# Patient Record
Sex: FEMALE | Race: BLACK OR AFRICAN AMERICAN | NOT HISPANIC OR LATINO | Employment: FULL TIME | URBAN - METROPOLITAN AREA
[De-identification: names, ages, dates, MRNs, and addresses within clinical notes are randomized per-mention and may not be internally consistent; named-entity substitution may affect disease eponyms.]

---

## 2023-05-16 ENCOUNTER — OFFICE VISIT (OUTPATIENT)
Dept: INTERNAL MEDICINE | Facility: CLINIC | Age: 53
End: 2023-05-16
Payer: COMMERCIAL

## 2023-05-16 VITALS
SYSTOLIC BLOOD PRESSURE: 126 MMHG | HEART RATE: 81 BPM | WEIGHT: 118.88 LBS | DIASTOLIC BLOOD PRESSURE: 82 MMHG | BODY MASS INDEX: 21.88 KG/M2 | OXYGEN SATURATION: 99 % | TEMPERATURE: 97 F | HEIGHT: 62 IN

## 2023-05-16 DIAGNOSIS — J04.0 LARYNGITIS: ICD-10-CM

## 2023-05-16 DIAGNOSIS — J06.9 UPPER RESPIRATORY TRACT INFECTION, UNSPECIFIED TYPE: Primary | ICD-10-CM

## 2023-05-16 PROCEDURE — 3074F SYST BP LT 130 MM HG: CPT | Mod: CPTII,S$GLB,, | Performed by: INTERNAL MEDICINE

## 2023-05-16 PROCEDURE — 1160F RVW MEDS BY RX/DR IN RCRD: CPT | Mod: CPTII,S$GLB,, | Performed by: INTERNAL MEDICINE

## 2023-05-16 PROCEDURE — 99999 PR PBB SHADOW E&M-EST. PATIENT-LVL IV: ICD-10-PCS | Mod: PBBFAC,,, | Performed by: INTERNAL MEDICINE

## 2023-05-16 PROCEDURE — 1159F PR MEDICATION LIST DOCUMENTED IN MEDICAL RECORD: ICD-10-PCS | Mod: CPTII,S$GLB,, | Performed by: INTERNAL MEDICINE

## 2023-05-16 PROCEDURE — 99214 OFFICE O/P EST MOD 30 MIN: CPT | Mod: S$GLB,,, | Performed by: INTERNAL MEDICINE

## 2023-05-16 PROCEDURE — 3079F PR MOST RECENT DIASTOLIC BLOOD PRESSURE 80-89 MM HG: ICD-10-PCS | Mod: CPTII,S$GLB,, | Performed by: INTERNAL MEDICINE

## 2023-05-16 PROCEDURE — 99999 PR PBB SHADOW E&M-EST. PATIENT-LVL IV: CPT | Mod: PBBFAC,,, | Performed by: INTERNAL MEDICINE

## 2023-05-16 PROCEDURE — 3008F PR BODY MASS INDEX (BMI) DOCUMENTED: ICD-10-PCS | Mod: CPTII,S$GLB,, | Performed by: INTERNAL MEDICINE

## 2023-05-16 PROCEDURE — 99214 PR OFFICE/OUTPT VISIT, EST, LEVL IV, 30-39 MIN: ICD-10-PCS | Mod: S$GLB,,, | Performed by: INTERNAL MEDICINE

## 2023-05-16 PROCEDURE — 3079F DIAST BP 80-89 MM HG: CPT | Mod: CPTII,S$GLB,, | Performed by: INTERNAL MEDICINE

## 2023-05-16 PROCEDURE — 3074F PR MOST RECENT SYSTOLIC BLOOD PRESSURE < 130 MM HG: ICD-10-PCS | Mod: CPTII,S$GLB,, | Performed by: INTERNAL MEDICINE

## 2023-05-16 PROCEDURE — 3008F BODY MASS INDEX DOCD: CPT | Mod: CPTII,S$GLB,, | Performed by: INTERNAL MEDICINE

## 2023-05-16 PROCEDURE — 1160F PR REVIEW ALL MEDS BY PRESCRIBER/CLIN PHARMACIST DOCUMENTED: ICD-10-PCS | Mod: CPTII,S$GLB,, | Performed by: INTERNAL MEDICINE

## 2023-05-16 PROCEDURE — 1159F MED LIST DOCD IN RCRD: CPT | Mod: CPTII,S$GLB,, | Performed by: INTERNAL MEDICINE

## 2023-05-16 NOTE — PATIENT INSTRUCTIONS
Thank you for coming to visit today.  Your symptoms are most compatible with an upper respiratory infection of viral etiology.  Viruses typically are involved in causing laryngitis.  Most of your symptoms are resolving by will list below my standard over-the-counter recommendations for any future colds and symptoms you may have.  Claritin for nasal congestion usually taken before sleep   Saline nasal spray 4 squirts each nostril every 2-3 hours.  Throat lozenges particularly sour types will increase the amount of saliva which will help make her throat feel better  As she discovered warm tea often helps you feel better and you should continue drinking that.  You should expect to recover in the next several days as you have already described the fact that you are getting better.

## 2023-05-16 NOTE — PROGRESS NOTES
53-year-old woman with no chronic medical problems, on no prescribed medications though she takes over-the-counter vitamins who is here with a chief complaint of a sore throat.    History of present illness and pertinent review of systems:  The patient has approximally a 3 day history of a sore throat and hoarseness.  She would no laryngitis.  She would no fever or chills but she did notice initially a generalized headache which resolved.  There is no earache or rhinorrhea but there was nasal congestion which again has improved.  She did have a cough which is improving without sputum production.  She had no pleurisy or hemoptysis.  She is had no loss of sense of taste or smell.  There has been no diarrhea, rash, myalgias, or malaise.  She notes that her grandchild had a URI and ear infection.  Patient drank tea and used for cola cough drops and felt that helped.  She is concerned a little bit about the failure of her voice to returned to normal.    Problem list, medication list, and allergies were reviewed.    Review of systems is otherwise unremarkable.        Physical Exam  Vitals and nursing note reviewed.   Constitutional:       General: She is not in acute distress.     Appearance: She is normal weight. She is not ill-appearing.   HENT:      Right Ear: Tympanic membrane, ear canal and external ear normal.      Left Ear: Tympanic membrane, ear canal and external ear normal.      Nose: Congestion present. No rhinorrhea.      Mouth/Throat:      Mouth: Mucous membranes are moist.      Pharynx: Oropharynx is clear. Posterior oropharyngeal erythema present. No oropharyngeal exudate.   Eyes:      General: No scleral icterus.     Conjunctiva/sclera: Conjunctivae normal.   Cardiovascular:      Rate and Rhythm: Normal rate and regular rhythm.      Pulses: Normal pulses.      Heart sounds: Normal heart sounds. No murmur heard.    No gallop.   Pulmonary:      Effort: Pulmonary effort is normal. No respiratory distress.       Breath sounds: Normal breath sounds. No wheezing or rales.   Abdominal:      General: Bowel sounds are normal. There is no distension.      Palpations: Abdomen is soft.      Tenderness: There is no abdominal tenderness.      Comments: No hepatosplenomegaly   Musculoskeletal:         General: No swelling or tenderness.      Cervical back: Neck supple. No tenderness.      Right lower leg: No edema.   Lymphadenopathy:      Cervical: No cervical adenopathy.   Skin:     Coloration: Skin is not jaundiced.      Findings: No bruising or rash.   Neurological:      General: No focal deficit present.      Mental Status: She is alert.   Psychiatric:         Mood and Affect: Mood normal.     Assessment/plan:   URI:  Patient has a URI associated with laryngitis which is resolving as are most of her symptoms.  At this time no antibiotics or steroids are necessary and over-the-counter medication should be adequate to address most of her symptoms.    Plan:  Reviewed above with patient   Warm saltwater gargles every 2-3 hours as necessary for patient's discomfort and voice.    Throat lozenges or cough drops particularly sour ones which will create more saliva   Claritin 10 mg if necessary for nasal congestion.  Saline nasal spray 4 squirts each nostril every 2-3 hours as needed      Time with patient record 30 minute

## 2023-10-10 ENCOUNTER — OFFICE VISIT (OUTPATIENT)
Dept: INTERNAL MEDICINE | Facility: CLINIC | Age: 53
End: 2023-10-10
Payer: COMMERCIAL

## 2023-10-10 ENCOUNTER — LAB VISIT (OUTPATIENT)
Dept: LAB | Facility: HOSPITAL | Age: 53
End: 2023-10-10
Payer: COMMERCIAL

## 2023-10-10 VITALS
BODY MASS INDEX: 22.03 KG/M2 | OXYGEN SATURATION: 99 % | SYSTOLIC BLOOD PRESSURE: 126 MMHG | DIASTOLIC BLOOD PRESSURE: 88 MMHG | WEIGHT: 119.69 LBS | HEART RATE: 74 BPM | HEIGHT: 62 IN

## 2023-10-10 DIAGNOSIS — L81.9 SKIN HYPOPIGMENTATION: ICD-10-CM

## 2023-10-10 DIAGNOSIS — L81.9 HYPOPIGMENTATION: Primary | ICD-10-CM

## 2023-10-10 DIAGNOSIS — L81.9 HYPOPIGMENTATION: ICD-10-CM

## 2023-10-10 DIAGNOSIS — Z00.00 HEALTHY ADULT ON ROUTINE PHYSICAL EXAMINATION: ICD-10-CM

## 2023-10-10 DIAGNOSIS — R12 MILD HEARTBURN: ICD-10-CM

## 2023-10-10 PROBLEM — L81.6 SKIN HYPOPIGMENTATION: Status: ACTIVE | Noted: 2023-10-10

## 2023-10-10 LAB
ALBUMIN SERPL BCP-MCNC: 4.2 G/DL (ref 3.5–5.2)
ALP SERPL-CCNC: 87 U/L (ref 55–135)
ALT SERPL W/O P-5'-P-CCNC: 24 U/L (ref 10–44)
ANION GAP SERPL CALC-SCNC: 8 MMOL/L (ref 8–16)
AST SERPL-CCNC: 23 U/L (ref 10–40)
BILIRUB SERPL-MCNC: 0.1 MG/DL (ref 0.1–1)
BUN SERPL-MCNC: 23 MG/DL (ref 6–20)
CALCIUM SERPL-MCNC: 9.7 MG/DL (ref 8.7–10.5)
CHLORIDE SERPL-SCNC: 105 MMOL/L (ref 95–110)
CHOLEST SERPL-MCNC: 202 MG/DL (ref 120–199)
CHOLEST/HDLC SERPL: 3 {RATIO} (ref 2–5)
CO2 SERPL-SCNC: 25 MMOL/L (ref 23–29)
CREAT SERPL-MCNC: 0.8 MG/DL (ref 0.5–1.4)
CRP SERPL-MCNC: 2.2 MG/L (ref 0–8.2)
EST. GFR  (NO RACE VARIABLE): >60 ML/MIN/1.73 M^2
GLUCOSE SERPL-MCNC: 93 MG/DL (ref 70–110)
HDLC SERPL-MCNC: 68 MG/DL (ref 40–75)
HDLC SERPL: 33.7 % (ref 20–50)
LDLC SERPL CALC-MCNC: 114.2 MG/DL (ref 63–159)
NONHDLC SERPL-MCNC: 134 MG/DL
POTASSIUM SERPL-SCNC: 4.1 MMOL/L (ref 3.5–5.1)
PROT SERPL-MCNC: 8 G/DL (ref 6–8.4)
SODIUM SERPL-SCNC: 138 MMOL/L (ref 136–145)
TRIGL SERPL-MCNC: 99 MG/DL (ref 30–150)

## 2023-10-10 PROCEDURE — 84443 ASSAY THYROID STIM HORMONE: CPT

## 2023-10-10 PROCEDURE — 85652 RBC SED RATE AUTOMATED: CPT

## 2023-10-10 PROCEDURE — 99214 PR OFFICE/OUTPT VISIT, EST, LEVL IV, 30-39 MIN: ICD-10-PCS | Mod: S$GLB,,,

## 2023-10-10 PROCEDURE — 3074F PR MOST RECENT SYSTOLIC BLOOD PRESSURE < 130 MM HG: ICD-10-PCS | Mod: CPTII,S$GLB,,

## 2023-10-10 PROCEDURE — 80061 LIPID PANEL: CPT

## 2023-10-10 PROCEDURE — 82652 VIT D 1 25-DIHYDROXY: CPT

## 2023-10-10 PROCEDURE — 86140 C-REACTIVE PROTEIN: CPT

## 2023-10-10 PROCEDURE — 3008F PR BODY MASS INDEX (BMI) DOCUMENTED: ICD-10-PCS | Mod: CPTII,S$GLB,,

## 2023-10-10 PROCEDURE — 3074F SYST BP LT 130 MM HG: CPT | Mod: CPTII,S$GLB,,

## 2023-10-10 PROCEDURE — 80053 COMPREHEN METABOLIC PANEL: CPT

## 2023-10-10 PROCEDURE — 99999 PR PBB SHADOW E&M-EST. PATIENT-LVL III: CPT | Mod: PBBFAC,,,

## 2023-10-10 PROCEDURE — 99999 PR PBB SHADOW E&M-EST. PATIENT-LVL III: ICD-10-PCS | Mod: PBBFAC,,,

## 2023-10-10 PROCEDURE — 86431 RHEUMATOID FACTOR QUANT: CPT

## 2023-10-10 PROCEDURE — 3008F BODY MASS INDEX DOCD: CPT | Mod: CPTII,S$GLB,,

## 2023-10-10 PROCEDURE — 3079F PR MOST RECENT DIASTOLIC BLOOD PRESSURE 80-89 MM HG: ICD-10-PCS | Mod: CPTII,S$GLB,,

## 2023-10-10 PROCEDURE — 82607 VITAMIN B-12: CPT

## 2023-10-10 PROCEDURE — 99214 OFFICE O/P EST MOD 30 MIN: CPT | Mod: S$GLB,,,

## 2023-10-10 PROCEDURE — 85025 COMPLETE CBC W/AUTO DIFF WBC: CPT

## 2023-10-10 PROCEDURE — 86038 ANTINUCLEAR ANTIBODIES: CPT

## 2023-10-10 PROCEDURE — 3079F DIAST BP 80-89 MM HG: CPT | Mod: CPTII,S$GLB,,

## 2023-10-10 NOTE — PROGRESS NOTES
"  Subjective     Chief Complaint: Hypopigmented macule around her mouth and Chest burn when coughing    History of Present Illness:  Ms. Cindi Boone is a 53 y.o. female w/no significant PMHx reports to clinic for hypopigmentation and heartburn     Hypopigementation spots around mouth  Around 6mos ago, she noticed that specifically around her mouth she had some peeling which turned into hypopigmented spots that have not gone away. She describes the area as "tight" and dry from time to time. She uses Vaseline when she has those symptoms. She went to [facility] about 3mos and they prescribed her Nystatin cream to use daily. She has not seen any improvement since starting the cream. They also suggested that she might have a Vitamin D deficiency and she started taking OTC Vitamin D. She takes Vitamin D, Vitamin B?, and Vitamin C for supplementals. She has no family history of autoimmune disease, skin disorders, or skin cancers. She denies any changes in her skin care routine, diet, or medications.   Heartburn  She reports having a non-productive cough yesterday that was accompanied with some burning in her chest. The pain lasted seconds after its initial presentation. The pain was in her midsternum and did not radiate at all. She denies any headaches, abdominal pain, blurry vision, or SOB.     Review of Systems   Constitutional:  Negative for chills, malaise/fatigue and weight loss.   Eyes:  Negative for blurred vision and redness.   Respiratory:  Negative for cough and shortness of breath.    Cardiovascular:  Negative for chest pain, palpitations and leg swelling.   Neurological:  Negative for weakness and headaches.       PAST HISTORY:     No past medical history on file.    Past Surgical History:   Procedure Laterality Date    HYSTERECTOMY  2008    UTERINE FIBROID SURGERY  2004       No family history on file.    Social History     Socioeconomic History    Marital status:    Tobacco Use    Smoking " "status: Never   Substance and Sexual Activity    Alcohol use: No    Drug use: No       MEDICATIONS & ALLERGIES:     No current outpatient medications on file prior to visit.     No current facility-administered medications on file prior to visit.       Review of patient's allergies indicates:   Allergen Reactions    Penicillins Nausea Only       OBJECTIVE:     Vital Signs:  Vitals:    10/10/23 1532   BP: 126/88   BP Location: Right arm   Patient Position: Sitting   BP Method: Medium (Manual)   Pulse: 74   SpO2: 99%   Weight: 54.3 kg (119 lb 11.4 oz)   Height: 5' 2" (1.575 m)       Body mass index is 21.9 kg/m².     Physical Exam  Constitutional:       General: She is not in acute distress.     Appearance: Normal appearance. She is normal weight. She is not ill-appearing.   HENT:      Head: Normocephalic.      Nose: No congestion or rhinorrhea.      Mouth/Throat:      Pharynx: No posterior oropharyngeal erythema.   Eyes:      General: No scleral icterus.        Right eye: No discharge.         Left eye: No discharge.      Extraocular Movements: Extraocular movements intact.   Cardiovascular:      Rate and Rhythm: Normal rate and regular rhythm.      Pulses: Normal pulses.      Heart sounds: Normal heart sounds. No murmur heard.     No friction rub. No gallop.   Pulmonary:      Effort: Pulmonary effort is normal. No respiratory distress.      Breath sounds: Normal breath sounds.   Musculoskeletal:         General: No swelling or tenderness.      Right lower leg: No edema.      Left lower leg: No edema.   Skin:     General: Skin is warm.      Coloration: Skin is not jaundiced.      Findings: No acne, bruising or burn. Rash is not crusting, urticarial or vesicular.      Comments: Hypopigmented macules localized around her mouth. Non-pruritus, non-erythematous.    Neurological:      Mental Status: She is alert and oriented to person, place, and time. Mental status is at baseline.   Psychiatric:         Mood and Affect: " Mood normal.         Behavior: Behavior normal.         Laboratory  No results found for this or any previous visit (from the past 24 hour(s)).    Diagnostic Results:      Health Maintenance Due   Topic Date Due    Hepatitis C Screening  Never done    HIV Screening  Never done    TETANUS VACCINE  Never done    Mammogram  02/20/2013    Colorectal Cancer Screening  Never done    Shingles Vaccine (1 of 2) Never done    Influenza Vaccine (1) 09/01/2023    COVID-19 Vaccine (3 - 2023-24 season) 09/01/2023         ASSESSMENT & PLAN:     Hypopigmentation  -     VITAMIN B12; Future; Expected date: 10/10/2023  -     Sedimentation rate; Future; Expected date: 10/10/2023  -     C-REACTIVE PROTEIN; Future; Expected date: 10/10/2023  -     TOI by IFA, w/Rflx; Future; Expected date: 10/10/2023  -     RHEUMATOID FACTOR; Future; Expected date: 10/10/2023    Healthy adult on routine physical examination  -     COMPREHENSIVE METABOLIC PANEL; Future; Expected date: 10/10/2023  -     CBC W/ AUTO DIFFERENTIAL; Future; Expected date: 10/10/2023  -     Calcitriol; Future; Expected date: 10/10/2023  -     LIPID PANEL; Future; Expected date: 10/10/2023  -     TSH; Future; Expected date: 10/10/2023    Mild heartburn      Unknown etiology of hypopigmentation. Ddx include Atopic Dermatitis, Vitiligo, SLE, Psoriasis, Scleroderma. Will obtain Labs and will follow-up.       See above for further detail.    RTC in 3mos    Discussed with Dr Pizano  - staff attestation to follow      Estee Torres M.D.   Internal Medicine PGY1

## 2023-10-11 LAB
ANA SER-ACNC: NORMAL
BASOPHILS # BLD AUTO: 0.04 K/UL (ref 0–0.2)
BASOPHILS NFR BLD: 0.8 % (ref 0–1.9)
DIFFERENTIAL METHOD: ABNORMAL
EOSINOPHIL # BLD AUTO: 0.4 K/UL (ref 0–0.5)
EOSINOPHIL NFR BLD: 7.6 % (ref 0–8)
ERYTHROCYTE [DISTWIDTH] IN BLOOD BY AUTOMATED COUNT: 14.3 % (ref 11.5–14.5)
ERYTHROCYTE [SEDIMENTATION RATE] IN BLOOD BY PHOTOMETRIC METHOD: 39 MM/HR (ref 0–36)
HCT VFR BLD AUTO: 45.2 % (ref 37–48.5)
HGB BLD-MCNC: 14.1 G/DL (ref 12–16)
IMM GRANULOCYTES # BLD AUTO: 0.01 K/UL (ref 0–0.04)
IMM GRANULOCYTES NFR BLD AUTO: 0.2 % (ref 0–0.5)
LYMPHOCYTES # BLD AUTO: 2.4 K/UL (ref 1–4.8)
LYMPHOCYTES NFR BLD: 47.6 % (ref 18–48)
MCH RBC QN AUTO: 30.1 PG (ref 27–31)
MCHC RBC AUTO-ENTMCNC: 31.2 G/DL (ref 32–36)
MCV RBC AUTO: 96 FL (ref 82–98)
MONOCYTES # BLD AUTO: 0.4 K/UL (ref 0.3–1)
MONOCYTES NFR BLD: 7.6 % (ref 4–15)
NEUTROPHILS # BLD AUTO: 1.8 K/UL (ref 1.8–7.7)
NEUTROPHILS NFR BLD: 36.2 % (ref 38–73)
NRBC BLD-RTO: 0 /100 WBC
PLATELET # BLD AUTO: 357 K/UL (ref 150–450)
PMV BLD AUTO: 12.5 FL (ref 9.2–12.9)
RBC # BLD AUTO: 4.69 M/UL (ref 4–5.4)
RHEUMATOID FACT SERPL-ACNC: <13 IU/ML (ref 0–15)
TSH SERPL DL<=0.005 MIU/L-ACNC: 2.52 UIU/ML (ref 0.4–4)
VIT B12 SERPL-MCNC: 1639 PG/ML (ref 210–950)
WBC # BLD AUTO: 5 K/UL (ref 3.9–12.7)

## 2023-10-13 LAB — 1,25(OH)2D3 SERPL-MCNC: 75 PG/ML (ref 20–79)

## 2024-06-24 ENCOUNTER — PATIENT OUTREACH (OUTPATIENT)
Dept: ADMINISTRATIVE | Facility: OTHER | Age: 54
End: 2024-06-24
Payer: COMMERCIAL

## 2024-06-24 NOTE — PROGRESS NOTES
CHW - Initial Contact    This Community Health Worker completed the Social Determinant of Health questionnaire with MRN [id number] over the phone today.    Pt identified barriers of most importance are: No barriers reported    Referrals to community agencies completed with patient/caregiver consent outside of [facility] include: No  Referrals were put through [facility] - No  Support and Services: No support & services have been documented.  Other information discussed the patient needs / wants help with: SDOH completed. No assistance requested at this time.    Follow up required: No  No future outreach task assigned

## 2024-06-25 ENCOUNTER — OFFICE VISIT (OUTPATIENT)
Dept: PRIMARY CARE CLINIC | Facility: CLINIC | Age: 54
End: 2024-06-25
Payer: COMMERCIAL

## 2024-06-25 VITALS
HEIGHT: 62 IN | WEIGHT: 124.38 LBS | SYSTOLIC BLOOD PRESSURE: 114 MMHG | BODY MASS INDEX: 22.89 KG/M2 | HEART RATE: 73 BPM | DIASTOLIC BLOOD PRESSURE: 72 MMHG

## 2024-06-25 DIAGNOSIS — Z13.220 SCREENING CHOLESTEROL LEVEL: ICD-10-CM

## 2024-06-25 DIAGNOSIS — Z11.4 SCREENING FOR HIV (HUMAN IMMUNODEFICIENCY VIRUS): ICD-10-CM

## 2024-06-25 DIAGNOSIS — L81.9 SKIN HYPOPIGMENTATION: Primary | ICD-10-CM

## 2024-06-25 DIAGNOSIS — Z00.00 ADULT GENERAL MEDICAL EXAMINATION: ICD-10-CM

## 2024-06-25 DIAGNOSIS — Z12.11 SCREEN FOR COLON CANCER: ICD-10-CM

## 2024-06-25 DIAGNOSIS — Z12.39 ENCOUNTER FOR SCREENING FOR MALIGNANT NEOPLASM OF BREAST, UNSPECIFIED SCREENING MODALITY: ICD-10-CM

## 2024-06-25 PROCEDURE — 3008F BODY MASS INDEX DOCD: CPT | Mod: CPTII,S$GLB,,

## 2024-06-25 PROCEDURE — 99204 OFFICE O/P NEW MOD 45 MIN: CPT | Mod: S$GLB,,,

## 2024-06-25 PROCEDURE — 1159F MED LIST DOCD IN RCRD: CPT | Mod: CPTII,S$GLB,,

## 2024-06-25 PROCEDURE — 99999 PR PBB SHADOW E&M-EST. PATIENT-LVL IV: CPT | Mod: PBBFAC,,,

## 2024-06-25 PROCEDURE — 3078F DIAST BP <80 MM HG: CPT | Mod: CPTII,S$GLB,,

## 2024-06-25 PROCEDURE — 3074F SYST BP LT 130 MM HG: CPT | Mod: CPTII,S$GLB,,

## 2024-06-25 PROCEDURE — 1160F RVW MEDS BY RX/DR IN RCRD: CPT | Mod: CPTII,S$GLB,,

## 2024-06-25 NOTE — PROGRESS NOTES
Subjective     Patient ID: Cindi Boone is a 54 y.o. female.    Chief Complaint: Establish Care    XX is a XX year old female/male, presents to clinic for general medical exam.  New to me. XX PCP XX. Medical and surgical history, in addition to problem list reviewed and listed below.        Review of Systems       Objective     Physical Exam       Assessment and Plan     {There are no diagnoses linked to this encounter. (Refresh or delete this SmartLink)}    ***         No follow-ups on file.

## 2024-06-25 NOTE — PROGRESS NOTES
"Subjective     Patient ID: Cindi Boone is a 54 y.o. female.    Chief Complaint: Establish Care    Cindi Boone is a 54  year old female, presents to clinic for general medical exam for skin on face and want to check previous lab work results done in October 2023 to determine if vitamin deficiency.  New to me. PCP is Dr. Oliva. Medical and surgical history, in addition to problem list reviewed and listed below.     Previous PCP Dr. Oliva: was told has hypertension, prescribed medication ("think Lisinopril"). Initiated Lisinopril, then prescribed amlodipine. BP only high with office visits but not at home (logging).  Last took medications two years ago.    States been taking vitamin D for years and started vitamin C about one month ago (doesn't recall dosages).      Patient is concern about skin color by eyebrows and on face. Cristin has been seen by a dermatologists years ago; prescribed a cream that she think was a steroid.  Was told cannot keep using on face due to side effect.      Review of Systems   Constitutional:  Negative for chills, diaphoresis, fatigue and fever.   HENT:  Negative for ear pain, trouble swallowing and voice change.    Eyes:  Positive for visual disturbance (Don't see as well if reading). Negative for photophobia and pain.   Respiratory:  Negative for chest tightness and shortness of breath.    Cardiovascular:  Negative for chest pain and palpitations.   Gastrointestinal:  Negative for abdominal pain, constipation, diarrhea, nausea and vomiting.   Endocrine: Negative for polydipsia, polyphagia and polyuria.   Genitourinary:  Positive for hot flashes. Negative for difficulty urinating and pelvic pain.   Musculoskeletal:  Negative for back pain, gait problem, leg pain, neck pain and neck stiffness.   Integumentary:  Positive for rash.   Allergic/Immunologic: Negative for food allergies.   Neurological:  Negative for dizziness, tremors, seizures, syncope, numbness and headaches.   Hematological:  Does " "not bruise/bleed easily.   Psychiatric/Behavioral:  Negative for dysphoric mood, self-injury, sleep disturbance and suicidal ideas.      History reviewed. No pertinent past medical history.  Past Surgical History:   Procedure Laterality Date    HYSTERECTOMY  2008    UTERINE FIBROID SURGERY  2004     Social History     Socioeconomic History    Marital status:    Tobacco Use    Smoking status: Never   Substance and Sexual Activity    Alcohol use: No    Drug use: No     Social Determinants of Health     Financial Resource Strain: Low Risk  (6/24/2024)    Overall Financial Resource Strain (CARDIA)     Difficulty of Paying Living Expenses: Not very hard   Food Insecurity: No Food Insecurity (6/24/2024)    Hunger Vital Sign     Worried About Running Out of Food in the Last Year: Never true     Ran Out of Food in the Last Year: Never true   Transportation Needs: No Transportation Needs (6/24/2024)    PRAPARE - Transportation     Lack of Transportation (Medical): No     Lack of Transportation (Non-Medical): No   Physical Activity: Insufficiently Active (6/24/2024)    Exercise Vital Sign     Days of Exercise per Week: 3 days     Minutes of Exercise per Session: 30 min   Stress: No Stress Concern Present (6/24/2024)    [place] [employer] - Occupational Stress Questionnaire     Feeling of Stress : Not at all   Housing Stability: Low Risk  (6/24/2024)    Housing Stability Vital Sign     Unable to Pay for Housing in the Last Year: No     Homeless in the Last Year: No     Review of patient's allergies indicates:   Allergen Reactions    Penicillins Nausea Only     Patient Active Problem List   Diagnosis    Skin hypopigmentation        Objective   Vitals:    06/25/24 1349   BP: 114/72   BP Location: Right arm   Patient Position: Sitting   BP Method: Medium (Automatic)   Pulse: 73   Weight: 56.4 kg (124 lb 6.1 oz)   Height: 5' 2.4" (1.585 m)       Physical Exam  Constitutional:       General: She is not " in acute distress.     Appearance: Normal appearance. She is normal weight.   HENT:      Head: Normocephalic and atraumatic.      Right Ear: Tympanic membrane, ear canal and external ear normal.      Left Ear: Tympanic membrane, ear canal and external ear normal.      Nose: Nose normal.      Mouth/Throat:      Mouth: Mucous membranes are moist.      Pharynx: Oropharynx is clear. No oropharyngeal exudate or posterior oropharyngeal erythema.     Eyes:      Extraocular Movements: Extraocular movements intact.      Conjunctiva/sclera: Conjunctivae normal.      Pupils: Pupils are equal, round, and reactive to light.   Cardiovascular:      Rate and Rhythm: Normal rate and regular rhythm.      Pulses: Normal pulses.      Heart sounds: Normal heart sounds.   Pulmonary:      Effort: Pulmonary effort is normal. No respiratory distress.      Breath sounds: Normal breath sounds.   Abdominal:      General: Bowel sounds are normal.      Palpations: Abdomen is soft.   Musculoskeletal:         General: Normal range of motion.      Cervical back: Normal range of motion and neck supple.      Right lower leg: No edema.      Left lower leg: No edema.   Skin:     General: Skin is warm and dry.      Capillary Refill: Capillary refill takes less than 2 seconds.      Findings: No erythema, lesion or rash.      Comments: Hypopigmentation to eyebrows areas, philtral groove and areas above upper lip.    Neurological:      Mental Status: She is alert and oriented to person, place, and time.   Psychiatric:         Mood and Affect: Mood normal.         Behavior: Behavior normal.            Assessment and Plan     1. Skin hypopigmentation  -     Ambulatory referral/consult to Dermatology; Future; Expected date: 07/02/2024    2. Adult general medical examination  -     HEPATITIS C ANTIBODY; Future; Expected date: 06/25/2024  -     Comprehensive Metabolic Panel; Future; Expected date: 06/25/2024  -     CBC Auto Differential; Future; Expected date:  06/25/2024    3. Screening for HIV (human immunodeficiency virus)  -     HIV 1/2 Ag/Ab (4th Gen); Future; Expected date: 06/25/2024    4. Encounter for screening for malignant neoplasm of breast, unspecified screening modality  -     Mammo Digital Screening Bilat w/ Toi; Future; Expected date: 06/25/2024    5. Screen for colon cancer  -     Cologuard Screening (Multitarget Stool DNA); Future; Expected date: 06/25/2024    6. Screening cholesterol level  -     Lipid Panel; Future; Expected date: 06/25/2024       Follow up if symptoms worsen or fail to improve.  Follow up with PCP.    I spent a total of 59 minutes on the day of the visit.This includes face to face time and non-face to face time preparing to see the patient (eg, review of tests), obtaining and/or reviewing separately obtained history, documenting clinical information in the electronic or other health record, independently interpreting results and communicating results to the patient/family/caregiver, or care coordinator.     Lori A. Stephens Sandifer, FNP-C

## 2024-07-09 ENCOUNTER — OFFICE VISIT (OUTPATIENT)
Dept: DERMATOLOGY | Facility: CLINIC | Age: 54
End: 2024-07-09
Payer: COMMERCIAL

## 2024-07-09 ENCOUNTER — LAB VISIT (OUTPATIENT)
Dept: LAB | Facility: HOSPITAL | Age: 54
End: 2024-07-09
Payer: COMMERCIAL

## 2024-07-09 DIAGNOSIS — L81.8 POSTINFLAMMATORY HYPOPIGMENTATION: Primary | ICD-10-CM

## 2024-07-09 DIAGNOSIS — Z00.00 ADULT GENERAL MEDICAL EXAMINATION: ICD-10-CM

## 2024-07-09 DIAGNOSIS — Z11.4 SCREENING FOR HIV (HUMAN IMMUNODEFICIENCY VIRUS): ICD-10-CM

## 2024-07-09 DIAGNOSIS — Z13.220 SCREENING CHOLESTEROL LEVEL: ICD-10-CM

## 2024-07-09 LAB
ALBUMIN SERPL BCP-MCNC: 3.9 G/DL (ref 3.5–5.2)
ALP SERPL-CCNC: 79 U/L (ref 55–135)
ALT SERPL W/O P-5'-P-CCNC: 22 U/L (ref 10–44)
ANION GAP SERPL CALC-SCNC: 5 MMOL/L (ref 8–16)
AST SERPL-CCNC: 20 U/L (ref 10–40)
BASOPHILS # BLD AUTO: 0.04 K/UL (ref 0–0.2)
BASOPHILS NFR BLD: 0.9 % (ref 0–1.9)
BILIRUB SERPL-MCNC: 0.4 MG/DL (ref 0.1–1)
BUN SERPL-MCNC: 15 MG/DL (ref 6–20)
CALCIUM SERPL-MCNC: 9.5 MG/DL (ref 8.7–10.5)
CHLORIDE SERPL-SCNC: 109 MMOL/L (ref 95–110)
CHOLEST SERPL-MCNC: 194 MG/DL (ref 120–199)
CHOLEST/HDLC SERPL: 2.9 {RATIO} (ref 2–5)
CO2 SERPL-SCNC: 26 MMOL/L (ref 23–29)
CREAT SERPL-MCNC: 0.9 MG/DL (ref 0.5–1.4)
DIFFERENTIAL METHOD BLD: ABNORMAL
EOSINOPHIL # BLD AUTO: 0.2 K/UL (ref 0–0.5)
EOSINOPHIL NFR BLD: 5.5 % (ref 0–8)
ERYTHROCYTE [DISTWIDTH] IN BLOOD BY AUTOMATED COUNT: 13.9 % (ref 11.5–14.5)
EST. GFR  (NO RACE VARIABLE): >60 ML/MIN/1.73 M^2
GLUCOSE SERPL-MCNC: 79 MG/DL (ref 70–110)
HCT VFR BLD AUTO: 41.7 % (ref 37–48.5)
HCV AB SERPL QL IA: NORMAL
HDLC SERPL-MCNC: 66 MG/DL (ref 40–75)
HDLC SERPL: 34 % (ref 20–50)
HGB BLD-MCNC: 13.6 G/DL (ref 12–16)
HIV 1+2 AB+HIV1 P24 AG SERPL QL IA: NORMAL
IMM GRANULOCYTES # BLD AUTO: 0.01 K/UL (ref 0–0.04)
IMM GRANULOCYTES NFR BLD AUTO: 0.2 % (ref 0–0.5)
LDLC SERPL CALC-MCNC: 113.6 MG/DL (ref 63–159)
LYMPHOCYTES # BLD AUTO: 2.7 K/UL (ref 1–4.8)
LYMPHOCYTES NFR BLD: 60.9 % (ref 18–48)
MCH RBC QN AUTO: 30.2 PG (ref 27–31)
MCHC RBC AUTO-ENTMCNC: 32.6 G/DL (ref 32–36)
MCV RBC AUTO: 93 FL (ref 82–98)
MONOCYTES # BLD AUTO: 0.3 K/UL (ref 0.3–1)
MONOCYTES NFR BLD: 6.9 % (ref 4–15)
NEUTROPHILS # BLD AUTO: 1.1 K/UL (ref 1.8–7.7)
NEUTROPHILS NFR BLD: 25.6 % (ref 38–73)
NONHDLC SERPL-MCNC: 128 MG/DL
NRBC BLD-RTO: 0 /100 WBC
PLATELET # BLD AUTO: 341 K/UL (ref 150–450)
PLATELET BLD QL SMEAR: ABNORMAL
PMV BLD AUTO: 11.8 FL (ref 9.2–12.9)
POTASSIUM SERPL-SCNC: 4 MMOL/L (ref 3.5–5.1)
PROT SERPL-MCNC: 7 G/DL (ref 6–8.4)
RBC # BLD AUTO: 4.51 M/UL (ref 4–5.4)
SODIUM SERPL-SCNC: 140 MMOL/L (ref 136–145)
TRIGL SERPL-MCNC: 72 MG/DL (ref 30–150)
WBC # BLD AUTO: 4.35 K/UL (ref 3.9–12.7)

## 2024-07-09 PROCEDURE — 80053 COMPREHEN METABOLIC PANEL: CPT

## 2024-07-09 PROCEDURE — 1160F RVW MEDS BY RX/DR IN RCRD: CPT | Mod: CPTII,S$GLB,, | Performed by: PHYSICIAN ASSISTANT

## 2024-07-09 PROCEDURE — 36415 COLL VENOUS BLD VENIPUNCTURE: CPT

## 2024-07-09 PROCEDURE — 80061 LIPID PANEL: CPT

## 2024-07-09 PROCEDURE — 87389 HIV-1 AG W/HIV-1&-2 AB AG IA: CPT

## 2024-07-09 PROCEDURE — 99203 OFFICE O/P NEW LOW 30 MIN: CPT | Mod: S$GLB,,, | Performed by: PHYSICIAN ASSISTANT

## 2024-07-09 PROCEDURE — 99999 PR PBB SHADOW E&M-EST. PATIENT-LVL III: CPT | Mod: PBBFAC,,, | Performed by: PHYSICIAN ASSISTANT

## 2024-07-09 PROCEDURE — 1159F MED LIST DOCD IN RCRD: CPT | Mod: CPTII,S$GLB,, | Performed by: PHYSICIAN ASSISTANT

## 2024-07-09 PROCEDURE — 86803 HEPATITIS C AB TEST: CPT

## 2024-07-09 PROCEDURE — 85025 COMPLETE CBC W/AUTO DIFF WBC: CPT

## 2024-07-09 NOTE — PROGRESS NOTES
Subjective:      Patient ID:  Cindi Boone is a 54 y.o. female who presents for   Chief Complaint   Patient presents with    Skin Discoloration     Face      She reports that initially 1.5 years ago she had redness around her nose on each side. She also had raised skin around her upper lip on the right and left sides. Denies any dryness, bumps, burning, or tingling with it. She used steroid initially and then Aquaphor. She also used nystatin on it.     She reports that using a mask made it worse and hydrocortisone made it better. Denies any new products or medications besides using a different brand glycolic acid (but this was after the intially discoloration occurred)     She denies every having associated flaking, redness or scaling. She is current using nothing on it. She feels like it is getting better.             Review of Systems   Skin:  Negative for itching, rash, dry skin, daily sunscreen use, activity-related sunscreen use and recent sunburn.   Hematologic/Lymphatic: Does not bruise/bleed easily.       Objective:   Physical Exam   Constitutional: She appears well-developed and well-nourished. No distress.   Neurological: She is alert and oriented to person, place, and time. She is not disoriented.   Psychiatric: She has a normal mood and affect.   Skin:   Areas Examined (abnormalities noted in diagram):   Head / Face Inspection Performed                      Diagram Legend     Erythematous scaling macule/papule c/w actinic keratosis       Vascular papule c/w angioma      Pigmented verrucoid papule/plaque c/w seborrheic keratosis      Yellow umbilicated papule c/w sebaceous hyperplasia      Irregularly shaped tan macule c/w lentigo     1-2 mm smooth white papules consistent with Milia      Movable subcutaneous cyst with punctum c/w epidermal inclusion cyst      Subcutaneous movable cyst c/w pilar cyst      Firm pink to brown papule c/w dermatofibroma      Pedunculated fleshy papule(s) c/w skin tag(s)       Evenly pigmented macule c/w junctional nevus     Mildly variegated pigmented, slightly irregular-bordered macule c/w mildly atypical nevus      Flesh colored to evenly pigmented papule c/w intradermal nevus       Pink pearly papule/plaque c/w basal cell carcinoma      Erythematous hyperkeratotic cursted plaque c/w SCC      Surgical scar with no sign of skin cancer recurrence      Open and closed comedones      Inflammatory papules and pustules      Verrucoid papule consistent consistent with wart     Erythematous eczematous patches and plaques     Dystrophic onycholytic nail with subungual debris c/w onychomycosis     Umbilicated papule    Erythematous-base heme-crusted tan verrucoid plaque consistent with inflamed seborrheic keratosis     Erythematous Silvery Scaling Plaque c/w Psoriasis     See annotation      Assessment / Plan:        Postinflammatory hypopigmentation  -     Ambulatory referral/consult to Dermatology  -Discussed how it sounds like she may have had an inflammatory (rash, seborrheic dermatitis, etc.) response that caused her to have this lingering hypopigmentation   -Discussed that this should continue to resolve over the upcoming months and that using SPF 30+ would help prevent any worsening of it  -Recommended Elta MD sunscreen or Neutrogena tint (over-the-counter)  -Discussed not using hydrocortisone on face unless needed as this can cause lightening     Pt educated that overuse of steroids can lead to skin thinning/atrophy, hypopigmentation, striae.      A tint is recommended too- such as makeup, tinted sunscreen, BB/CC creams. The iron oxide in the tint of products protects against agents other than UV light that damage our skin such as blue light from screens, infrared heat, visible light, and other other environmental pollutants.  These other factors can contribute significantly to irregular pigment, especially in skin of color and tint helps protect from these factors.       She may be  interested in getting a Vi peel from Dr. Matthews in the future. Reach back out to the office if you would like to schedule.            Follow up if symptoms worsen or fail to improve.

## 2024-07-09 NOTE — PATIENT INSTRUCTIONS
Postinflammatory hypopigmentation  -     Ambulatory referral/consult to Dermatology  -Discussed how it sounds like she may have had an inflammatory (rash, seborrheic dermatitis, etc.) response that caused her to have this lingering hypopigmentation   -Discussed that this should continue to resolve over the upcoming months and that using SPF 30+ would help prevent any worsening of it  -Recommended Elta MD sunscreen or Neutrogena tint (over-the-counter)  -Discussed not using hydrocortisone on face unless needed as this can cause lightening     Pt educated that overuse of steroids can lead to skin thinning/atrophy, hypopigmentation, striae.      A tint is recommended too- such as makeup, tinted sunscreen, BB/CC creams. The iron oxide in the tint of products protects against agents other than UV light that damage our skin such as blue light from screens, infrared heat, visible light, and other other environmental pollutants.  These other factors can contribute significantly to irregular pigment, especially in skin of color and tint helps protect from these factors.       She may be interested in getting a Vi peel from Dr. Matthews in the future. Reach back out to the office if you would like to schedule.            Follow up if symptoms worsen or fail to improve.

## 2024-07-12 ENCOUNTER — TELEPHONE (OUTPATIENT)
Dept: PRIMARY CARE CLINIC | Facility: CLINIC | Age: 54
End: 2024-07-12
Payer: COMMERCIAL

## 2024-07-12 NOTE — TELEPHONE ENCOUNTER
Spoke with patient regarding lab results (drawn 07/09/2024).  Patient was given the opportunity to ask questions.  Patient voiced understanding of labs and plan. Informed patient of appointment scheduled for mammogram.

## 2024-07-18 ENCOUNTER — TELEPHONE (OUTPATIENT)
Dept: PRIMARY CARE CLINIC | Facility: CLINIC | Age: 54
End: 2024-07-18
Payer: COMMERCIAL

## 2024-07-18 NOTE — TELEPHONE ENCOUNTER
Spoke with patient regarding Cologuard results.  Patient was given the opportunity to ask questions.  Patient voiced understanding of results and plan.

## 2024-10-08 ENCOUNTER — HOSPITAL ENCOUNTER (OUTPATIENT)
Dept: RADIOLOGY | Facility: HOSPITAL | Age: 54
Discharge: HOME | End: 2024-10-08
Payer: COMMERCIAL

## 2024-10-08 DIAGNOSIS — Z12.39 ENCOUNTER FOR SCREENING FOR MALIGNANT NEOPLASM OF BREAST, UNSPECIFIED SCREENING MODALITY: ICD-10-CM

## 2024-10-08 PROCEDURE — 77067 SCR MAMMO BI INCL CAD: CPT | Mod: TC

## 2024-10-08 PROCEDURE — 77067 SCR MAMMO BI INCL CAD: CPT | Mod: 26,,, | Performed by: RADIOLOGY

## 2024-10-08 PROCEDURE — 77063 BREAST TOMOSYNTHESIS BI: CPT | Mod: 26,,, | Performed by: RADIOLOGY

## 2025-02-05 ENCOUNTER — OFFICE VISIT (OUTPATIENT)
Dept: PRIMARY CARE CLINIC | Facility: CLINIC | Age: 55
End: 2025-02-05
Payer: COMMERCIAL

## 2025-02-05 VITALS
RESPIRATION RATE: 16 BRPM | HEIGHT: 62 IN | WEIGHT: 126.88 LBS | DIASTOLIC BLOOD PRESSURE: 77 MMHG | HEART RATE: 72 BPM | SYSTOLIC BLOOD PRESSURE: 135 MMHG | BODY MASS INDEX: 23.35 KG/M2 | OXYGEN SATURATION: 100 %

## 2025-02-05 DIAGNOSIS — L81.6 SKIN HYPOPIGMENTATION: Primary | ICD-10-CM

## 2025-02-05 DIAGNOSIS — Z71.2 ENCOUNTER TO DISCUSS TEST RESULTS: ICD-10-CM

## 2025-02-05 PROCEDURE — 3075F SYST BP GE 130 - 139MM HG: CPT | Mod: CPTII,S$GLB,,

## 2025-02-05 PROCEDURE — 1160F RVW MEDS BY RX/DR IN RCRD: CPT | Mod: CPTII,S$GLB,,

## 2025-02-05 PROCEDURE — 1159F MED LIST DOCD IN RCRD: CPT | Mod: CPTII,S$GLB,,

## 2025-02-05 PROCEDURE — 3008F BODY MASS INDEX DOCD: CPT | Mod: CPTII,S$GLB,,

## 2025-02-05 PROCEDURE — 3078F DIAST BP <80 MM HG: CPT | Mod: CPTII,S$GLB,,

## 2025-02-05 PROCEDURE — 99999 PR PBB SHADOW E&M-EST. PATIENT-LVL III: CPT | Mod: PBBFAC,,,

## 2025-02-05 PROCEDURE — 99215 OFFICE O/P EST HI 40 MIN: CPT | Mod: S$GLB,,,

## 2025-02-05 NOTE — PROGRESS NOTES
"Subjective     Patient ID: Cindi Boone is a 55 y.o. female.    Chief Complaint: Follow-up      Jean Boone is a 55 year old female, presents to clinic for general medical exam.   Medical and surgical history, in addition to problem list reviewed and listed below.    Patient presents to get lab work done to determine if has yeast in mouth.  Stated started taking garlic tablets and women's 50+ vitamin.  States areas around mouth is not as dry due to stop eating popocorn; eating more vegetables.  Does see improvement with skin lightening to areas.    Recommended stretching and good body mechanics.    Follow-up  Pertinent negatives include no abdominal pain, chest pain, chills, diaphoresis, fever, nausea, neck pain, sore throat or vomiting.     Review of Systems   Constitutional:  Negative for chills, diaphoresis and fever.   HENT:  Positive for mouth dryness. Negative for sore throat, trouble swallowing and voice change.    Eyes:  Negative for pain.   Respiratory:  Negative for chest tightness and shortness of breath.    Cardiovascular:  Negative for chest pain and palpitations.   Gastrointestinal:  Negative for abdominal pain, nausea and vomiting.   Genitourinary:  Negative for difficulty urinating.   Musculoskeletal:  Positive for back pain (had this morning that has left). Negative for neck pain and neck stiffness.     History reviewed. No pertinent past medical history.  Past Surgical History:   Procedure Laterality Date    HYSTERECTOMY  2008    UTERINE FIBROID SURGERY  2004     Social History[1]  Review of patient's allergies indicates:   Allergen Reactions    Penicillins Nausea Only     Problem List[2]     Objective   Vitals:    02/05/25 0910   BP: 135/77   BP Location: Right arm   Patient Position: Sitting   Pulse: 72   Resp: 16   SpO2: 100%   Weight: 57.6 kg (126 lb 14 oz)   Height: 5' 2" (1.575 m)       Physical Exam  Constitutional:       General: She is not in acute distress.     Appearance: Normal " appearance. She is normal weight. She is not ill-appearing.   HENT:      Head: Normocephalic and atraumatic.      Right Ear: Tympanic membrane, ear canal and external ear normal.      Left Ear: Tympanic membrane, ear canal and external ear normal.      Nose: Nose normal.      Mouth/Throat:      Lips: Pink. No lesions.      Mouth: Mucous membranes are moist. No oral lesions.      Tongue: No lesions. Tongue does not deviate from midline.      Palate: No lesions.      Pharynx: Oropharynx is clear. Uvula midline. No pharyngeal swelling, oropharyngeal exudate, posterior oropharyngeal erythema or uvula swelling.   Eyes:      Extraocular Movements: Extraocular movements intact.      Conjunctiva/sclera: Conjunctivae normal.      Pupils: Pupils are equal, round, and reactive to light.   Cardiovascular:      Rate and Rhythm: Normal rate and regular rhythm.      Pulses: Normal pulses.      Heart sounds: Normal heart sounds.   Pulmonary:      Effort: Pulmonary effort is normal. No respiratory distress.      Breath sounds: Normal breath sounds.   Abdominal:      General: Bowel sounds are normal.      Palpations: Abdomen is soft.   Musculoskeletal:         General: Normal range of motion.      Cervical back: Normal range of motion and neck supple.      Right lower leg: No edema.      Left lower leg: No edema.   Skin:     General: Skin is warm and dry.      Capillary Refill: Capillary refill takes less than 2 seconds.      Findings: No rash.   Neurological:      Mental Status: She is alert and oriented to person, place, and time.   Psychiatric:         Mood and Affect: Mood normal.         Behavior: Behavior normal.            Assessment and Plan     1. Skin hypopigmentation        -     Continue regimen as discussed per dermatologist.    2. Encounter to discuss test results  Discussed lab results from July 2024.      No current outpatient medications on file as of 2/5/2025.     No current facility-administered medications on file  as of 2/5/2025.          Follow up if symptoms worsen or fail to improve.      I spent a total of 42 minutes on the day of the visit.  This includes face to face time and non-face to face time preparing to see the patient (e.g,. review of tests), obtaining and/or reviewing separately obtained history, documenting clinical information in the electronic or other health record, independently interpreting results and communicating results to the patient/family/caregiver, or care coordinator.    Lori A. Stephens Sandifer, LAURA-C         [1]   Social History  Socioeconomic History    Marital status:    Tobacco Use    Smoking status: Never   Substance and Sexual Activity    Alcohol use: No    Drug use: No     Social Drivers of Health     Financial Resource Strain: Low Risk  (6/24/2024)    Overall Financial Resource Strain (CARDIA)     Difficulty of Paying Living Expenses: Not very hard   Food Insecurity: No Food Insecurity (6/24/2024)    Hunger Vital Sign     Worried About Running Out of Food in the Last Year: Never true     Ran Out of Food in the Last Year: Never true   Transportation Needs: No Transportation Needs (6/24/2024)    PRAPARE - Transportation     Lack of Transportation (Medical): No     Lack of Transportation (Non-Medical): No   Physical Activity: Insufficiently Active (6/24/2024)    Exercise Vital Sign     Days of Exercise per Week: 3 days     Minutes of Exercise per Session: 30 min   Stress: No Stress Concern Present (6/24/2024)    [employer] - Occupational Stress Questionnaire     Feeling of Stress : Not at all   Housing Stability: Unknown (6/24/2024)    Housing Stability Vital Sign     Unable to Pay for Housing in the Last Year: No     Homeless in the Last Year: No   [2]   Patient Active Problem List  Diagnosis    Skin hypopigmentation

## 2025-04-29 ENCOUNTER — LAB VISIT (OUTPATIENT)
Dept: LAB | Facility: HOSPITAL | Age: 55
End: 2025-04-29

## 2025-04-29 ENCOUNTER — OFFICE VISIT (OUTPATIENT)
Dept: INTERNAL MEDICINE | Facility: CLINIC | Age: 55
End: 2025-04-29

## 2025-04-29 VITALS
HEIGHT: 62 IN | HEART RATE: 65 BPM | WEIGHT: 127.88 LBS | DIASTOLIC BLOOD PRESSURE: 72 MMHG | SYSTOLIC BLOOD PRESSURE: 123 MMHG | BODY MASS INDEX: 23.53 KG/M2 | OXYGEN SATURATION: 99 %

## 2025-04-29 DIAGNOSIS — B37.83 CANDIDAL CHEILITIS: Primary | ICD-10-CM

## 2025-04-29 DIAGNOSIS — L81.9 HYPOPIGMENTATION: ICD-10-CM

## 2025-04-29 DIAGNOSIS — B37.83 CANDIDAL CHEILITIS: ICD-10-CM

## 2025-04-29 LAB
ABSOLUTE EOSINOPHIL (OHS): 0.17 K/UL
ABSOLUTE MONOCYTE (OHS): 0.3 K/UL (ref 0.3–1)
ABSOLUTE NEUTROPHIL COUNT (OHS): 1.04 K/UL (ref 1.8–7.7)
BASOPHILS # BLD AUTO: 0.03 K/UL
BASOPHILS NFR BLD AUTO: 0.7 %
ERYTHROCYTE [DISTWIDTH] IN BLOOD BY AUTOMATED COUNT: 14.3 % (ref 11.5–14.5)
HCT VFR BLD AUTO: 44.3 % (ref 37–48.5)
HGB BLD-MCNC: 13.9 GM/DL (ref 12–16)
IMM GRANULOCYTES # BLD AUTO: 0.01 K/UL (ref 0–0.04)
IMM GRANULOCYTES NFR BLD AUTO: 0.2 % (ref 0–0.5)
LYMPHOCYTES # BLD AUTO: 2.82 K/UL (ref 1–4.8)
MCH RBC QN AUTO: 29 PG (ref 27–31)
MCHC RBC AUTO-ENTMCNC: 31.4 G/DL (ref 32–36)
MCV RBC AUTO: 93 FL (ref 82–98)
NUCLEATED RBC (/100WBC) (OHS): 0 /100 WBC
PLATELET # BLD AUTO: 376 K/UL (ref 150–450)
PMV BLD AUTO: 11.2 FL (ref 9.2–12.9)
RBC # BLD AUTO: 4.79 M/UL (ref 4–5.4)
RELATIVE EOSINOPHIL (OHS): 3.9 %
RELATIVE LYMPHOCYTE (OHS): 64.5 % (ref 18–48)
RELATIVE MONOCYTE (OHS): 6.9 % (ref 4–15)
RELATIVE NEUTROPHIL (OHS): 23.8 % (ref 38–73)
VIT B12 SERPL-MCNC: 1830 PG/ML (ref 210–950)
WBC # BLD AUTO: 4.37 K/UL (ref 3.9–12.7)

## 2025-04-29 PROCEDURE — 84425 ASSAY OF VITAMIN B-1: CPT

## 2025-04-29 PROCEDURE — 85025 COMPLETE CBC W/AUTO DIFF WBC: CPT

## 2025-04-29 PROCEDURE — 99214 OFFICE O/P EST MOD 30 MIN: CPT | Mod: S$PBB,,, | Performed by: INTERNAL MEDICINE

## 2025-04-29 PROCEDURE — 99999 PR PBB SHADOW E&M-EST. PATIENT-LVL IV: CPT | Mod: PBBFAC,,, | Performed by: INTERNAL MEDICINE

## 2025-04-29 PROCEDURE — 99214 OFFICE O/P EST MOD 30 MIN: CPT | Mod: PBBFAC | Performed by: INTERNAL MEDICINE

## 2025-04-29 PROCEDURE — 84207 ASSAY OF VITAMIN B-6: CPT

## 2025-04-29 PROCEDURE — 82607 VITAMIN B-12: CPT

## 2025-04-29 PROCEDURE — 36415 COLL VENOUS BLD VENIPUNCTURE: CPT

## 2025-04-29 RX ORDER — CLOTRIMAZOLE 1 %
CREAM (GRAM) TOPICAL 2 TIMES DAILY
Qty: 14 G | Refills: 2 | Status: SHIPPED | OUTPATIENT
Start: 2025-04-29

## 2025-04-29 NOTE — PROGRESS NOTES
Subjective:       Patient ID: Cindi Boone is a 55 y.o. female.    Chief Complaint: Rash    Ms. Boone presents today for persistent skin issues on face.    DERMATOLOGIC:  She reports changes in skin texture with small bumps, significant dryness (especially after morning face washing), small cuts on sides of her mouth , and perioral puffiness and discoloration.   She uses Aquaphor on affected areas. She has previously consulted 2-3 dermatologists, with the most recent suggesting an allergic etiology. .    Has had hypopigmentation and thickening of the skin around her mouth. Has now developed cheilitis around her the corners of her mouth.     Had recent labs done at [facility] that were unrevealing.         ROS:  General: -fever, -chills, -fatigue, -weight gain, -weight loss  Eyes: -vision changes, -redness, -discharge  ENT: -ear pain, -nasal congestion, -sore throat  Cardiovascular: -chest pain, -palpitations, -lower extremity edema  Respiratory: -cough, -shortness of breath  Gastrointestinal: -abdominal pain, -nausea, -vomiting, -diarrhea, -constipation, -blood in stool  Genitourinary: -dysuria, -hematuria, -frequency  Musculoskeletal: -joint pain, -muscle pain  Skin: -rash, +lesion, +skin texture changes, +lumps/masses, +dry skin  Neurological: -headache, -dizziness, -numbness, -tingling  Psychiatric: -anxiety, -depression, -sleep difficulty  Female Genitourinary: +absent or irregular periods                   No past medical history on file.  Past Surgical History:   Procedure Laterality Date    HYSTERECTOMY  2008    UTERINE FIBROID SURGERY  2004      Problem List[1]     Objective:      Physical Exam  Constitutional:       Appearance: Normal appearance.   HENT:      Head: Normocephalic.   Cardiovascular:      Rate and Rhythm: Normal rate and regular rhythm.      Pulses: Normal pulses.      Heart sounds: Normal heart sounds.   Pulmonary:      Effort: Pulmonary effort is normal.      Breath sounds: Normal breath sounds.  "  Abdominal:      General: Abdomen is flat. Bowel sounds are normal.      Palpations: Abdomen is soft.   Musculoskeletal:         General: Normal range of motion.      Cervical back: Normal range of motion and neck supple.   Skin:     General: Skin is warm and dry.   Neurological:      General: No focal deficit present.      Mental Status: She is alert and oriented to person, place, and time.   Psychiatric:         Mood and Affect: Mood normal.         Assessment:       Problem List Items Addressed This Visit    None  Visit Diagnoses         Candidal cheilitis    -  Primary    Relevant Orders    CBC Auto Differential    Vitamin B12    Vitamin B6    Vitamin B1      Hypopigmentation        Relevant Orders    Ambulatory referral/consult to Dermatology            Plan:         Cindi Shepard" was seen today for rash.    Diagnoses and all orders for this visit:    Candidal cheilitis  -     CBC Auto Differential; Future  -     Vitamin B12; Future  -     Vitamin B6; Future  -     Vitamin B1; Future  -     clotrimazole (LOTRIMIN) 1 % cream; Apply topically 2 (two) times daily.    Hypopigmentation  -     Ambulatory referral/consult to Dermatology; Future    - Examination revealed hypopigmentation and possible angular cheilitis.  - Considered possible causes including fungal infection and vitamin deficiency.  - Prescribed topical antifungal cream to be applied twice daily for 14 days.  - Referred the patient to dermatology for further evaluation of skin concerns.  - Will check B vitamins.                  This note was generated with the assistance of ambient listening technology. Verbal consent was obtained by the patient and accompanying visitor(s) for the recording of patient appointment to facilitate this note. I attest to having reviewed and edited the generated note for accuracy, though some syntax or spelling errors may persist. Please contact the author of this note for any clarification.       Mamta Smith MD "   Internal Medicine   Primary Care           [1]   Patient Active Problem List  Diagnosis    Skin hypopigmentation

## 2025-05-06 LAB — W VITAMIN B6: 17 UG/L

## 2025-05-09 LAB — W VITAMIN B1: 45 UG/L

## 2025-05-12 ENCOUNTER — RESULTS FOLLOW-UP (OUTPATIENT)
Dept: INTERNAL MEDICINE | Facility: CLINIC | Age: 55
End: 2025-05-12